# Patient Record
Sex: MALE | Race: WHITE | Employment: OTHER | URBAN - METROPOLITAN AREA
[De-identification: names, ages, dates, MRNs, and addresses within clinical notes are randomized per-mention and may not be internally consistent; named-entity substitution may affect disease eponyms.]

---

## 2017-01-01 ENCOUNTER — APPOINTMENT (OUTPATIENT)
Dept: CT IMAGING | Facility: HOSPITAL | Age: 82
End: 2017-01-01
Attending: EMERGENCY MEDICINE
Payer: MEDICARE

## 2017-01-01 ENCOUNTER — APPOINTMENT (OUTPATIENT)
Dept: GENERAL RADIOLOGY | Facility: HOSPITAL | Age: 82
End: 2017-01-01
Attending: EMERGENCY MEDICINE
Payer: MEDICARE

## 2017-01-01 ENCOUNTER — OFFICE VISIT (OUTPATIENT)
Dept: NEUROLOGY | Facility: CLINIC | Age: 82
End: 2017-01-01

## 2017-01-01 ENCOUNTER — HOSPITAL ENCOUNTER (EMERGENCY)
Facility: HOSPITAL | Age: 82
Discharge: HOME | End: 2017-01-01
Attending: EMERGENCY MEDICINE
Payer: MEDICARE

## 2017-01-01 ENCOUNTER — TELEPHONE (OUTPATIENT)
Dept: NEUROLOGY | Facility: CLINIC | Age: 82
End: 2017-01-01

## 2017-01-01 ENCOUNTER — HOSPITAL ENCOUNTER (OUTPATIENT)
Dept: ULTRASOUND IMAGING | Age: 82
Discharge: HOME | End: 2017-01-01
Attending: INTERNAL MEDICINE
Payer: MEDICARE

## 2017-01-01 ENCOUNTER — PRIOR ORIGINAL RECORDS (OUTPATIENT)
Dept: OTHER | Age: 82
End: 2017-01-01

## 2017-01-01 VITALS
DIASTOLIC BLOOD PRESSURE: 72 MMHG | WEIGHT: 174 LBS | HEART RATE: 60 BPM | SYSTOLIC BLOOD PRESSURE: 122 MMHG | BODY MASS INDEX: 25 KG/M2 | RESPIRATION RATE: 16 BRPM

## 2017-01-01 VITALS
HEIGHT: 70 IN | BODY MASS INDEX: 25.77 KG/M2 | SYSTOLIC BLOOD PRESSURE: 122 MMHG | WEIGHT: 180 LBS | RESPIRATION RATE: 16 BRPM | DIASTOLIC BLOOD PRESSURE: 74 MMHG | HEART RATE: 80 BPM

## 2017-01-01 VITALS
TEMPERATURE: 98 F | HEART RATE: 66 BPM | RESPIRATION RATE: 18 BRPM | OXYGEN SATURATION: 99 % | SYSTOLIC BLOOD PRESSURE: 142 MMHG | DIASTOLIC BLOOD PRESSURE: 90 MMHG

## 2017-01-01 DIAGNOSIS — R56.9 SEIZURES (HCC): ICD-10-CM

## 2017-01-01 DIAGNOSIS — I69.320 APHASIA AS LATE EFFECT OF CEREBROVASCULAR ACCIDENT: ICD-10-CM

## 2017-01-01 DIAGNOSIS — IMO0002 APHASIA COMPLICATING STROKE: Primary | ICD-10-CM

## 2017-01-01 DIAGNOSIS — S51.802A AVULSION OF SKIN OF LEFT FOREARM, INITIAL ENCOUNTER: Primary | ICD-10-CM

## 2017-01-01 DIAGNOSIS — G93.40 ENCEPHALOPATHY: Primary | ICD-10-CM

## 2017-01-01 DIAGNOSIS — M79.89 LEG SWELLING: ICD-10-CM

## 2017-01-01 DIAGNOSIS — G93.40 ENCEPHALOPATHY: ICD-10-CM

## 2017-01-01 PROCEDURE — 82550 ASSAY OF CK (CPK): CPT | Performed by: INTERNAL MEDICINE

## 2017-01-01 PROCEDURE — 99214 OFFICE O/P EST MOD 30 MIN: CPT | Performed by: OTHER

## 2017-01-01 PROCEDURE — 99283 EMERGENCY DEPT VISIT LOW MDM: CPT

## 2017-01-01 PROCEDURE — 80053 COMPREHEN METABOLIC PANEL: CPT | Performed by: INTERNAL MEDICINE

## 2017-01-01 PROCEDURE — 93971 EXTREMITY STUDY: CPT | Performed by: INTERNAL MEDICINE

## 2017-01-01 PROCEDURE — 70450 CT HEAD/BRAIN W/O DYE: CPT | Performed by: EMERGENCY MEDICINE

## 2017-01-01 PROCEDURE — 84443 ASSAY THYROID STIM HORMONE: CPT | Performed by: INTERNAL MEDICINE

## 2017-01-01 PROCEDURE — 82306 VITAMIN D 25 HYDROXY: CPT | Performed by: INTERNAL MEDICINE

## 2017-01-01 PROCEDURE — 73090 X-RAY EXAM OF FOREARM: CPT | Performed by: EMERGENCY MEDICINE

## 2017-01-01 PROCEDURE — 85025 COMPLETE CBC W/AUTO DIFF WBC: CPT | Performed by: INTERNAL MEDICINE

## 2017-01-01 PROCEDURE — 80061 LIPID PANEL: CPT | Performed by: INTERNAL MEDICINE

## 2017-01-01 RX ORDER — QUETIAPINE 50 MG/1
50 TABLET, FILM COATED ORAL NIGHTLY
Qty: 30 TABLET | Refills: 3 | Status: SHIPPED | OUTPATIENT
Start: 2017-01-01 | End: 2018-01-01 | Stop reason: DRUGHIGH

## 2017-01-01 RX ORDER — QUETIAPINE 50 MG/1
50 TABLET, FILM COATED ORAL NIGHTLY
Qty: 30 TABLET | Refills: 3 | Status: SHIPPED | OUTPATIENT
Start: 2017-01-01 | End: 2017-01-01

## 2017-01-01 RX ORDER — TETANUS AND DIPHTHERIA TOXOIDS ADSORBED 2; 2 [LF]/.5ML; [LF]/.5ML
0.5 INJECTION INTRAMUSCULAR ONCE
Status: DISCONTINUED | OUTPATIENT
Start: 2017-01-01 | End: 2017-01-01

## 2017-01-13 ENCOUNTER — TELEPHONE (OUTPATIENT)
Dept: NEUROLOGY | Facility: CLINIC | Age: 82
End: 2017-01-13

## 2017-01-13 NOTE — TELEPHONE ENCOUNTER
Spoke to patient's daughter. She states that patient has a follow-up with Dr. You Handy this Monday. She is calling because she is concerned about patient's sleeping pattern.  She states that he is up to melatonin 7 mg at night which helps sometimes but not all

## 2017-01-13 NOTE — TELEPHONE ENCOUNTER
Talk to the patient's daughter– family is resistant to start any alprazolam.Stacey.   He has not had any significant

## 2017-01-16 ENCOUNTER — OFFICE VISIT (OUTPATIENT)
Dept: NEUROLOGY | Facility: CLINIC | Age: 82
End: 2017-01-16

## 2017-01-16 VITALS
HEIGHT: 71 IN | DIASTOLIC BLOOD PRESSURE: 80 MMHG | SYSTOLIC BLOOD PRESSURE: 132 MMHG | RESPIRATION RATE: 16 BRPM | WEIGHT: 175 LBS | BODY MASS INDEX: 24.5 KG/M2

## 2017-01-16 DIAGNOSIS — G40.909 SEIZURE DISORDER (HCC): Primary | ICD-10-CM

## 2017-01-16 DIAGNOSIS — F01.50 VASCULAR DEMENTIA WITHOUT BEHAVIORAL DISTURBANCE (HCC): ICD-10-CM

## 2017-01-16 PROCEDURE — 99213 OFFICE O/P EST LOW 20 MIN: CPT | Performed by: OTHER

## 2017-01-16 NOTE — PATIENT INSTRUCTIONS
Refill policies:    • Allow 2 business days for refills; controlled substances may take longer. • Contact our office at least 5 days prior to running out of medication or submit request through the “request refill” option in your [employer] account.   • Ref have a procedure or additional testing performed. [facility]) will contact your insurance carrier to obtain pre-certification or prior authorization.     Unfortunately, ALMITA has seen an increase in denial of payment even though the p

## 2017-01-16 NOTE — PROGRESS NOTES
Neurology follow-up  Patient is doing well. He has not had any word finding difficulty episodes, seizure type episodes that he had over the summer. He is doing well on generic Keppra 500 twice a day.   He underwent physical therapy and his balance is impr

## 2017-04-21 NOTE — PROGRESS NOTES
Tammy Santos : 1932     HPI:   Patient presents with:  Neurologic Problem: Patient was seen by Dr. Yulisa Prieto 17. Wife, Salima Coffey, accompanies.  Wife says that from May until 2016, patient has had episodes of slurred speech, droopy left tablet Rfl: 1   EXELON 4.6 MG/24HR Transdermal Patch 24 Hr PLACE 1 PATCH ONTO THE SKIN DAILY.  Disp: 90 patch Rfl: 1   CLOPIDOGREL BISULFATE 75 MG Oral Tab TAKE 1 TABLET BY MOUTH EVERY DAY Disp: 90 tablet Rfl: 1   Melatonin 3 MG Oral Cap Take 2 capsules by benign    • Seizure disorder [facility])    • Cerebrovascular accident (CVA) [facility])    • Cholecystitis           Past Surgical History    HERNIA SURGERY  11/21/2003    KNEE REPLACEMENT SURGERY Left 12/2004    CHOLECYSTECTOMY      SKIN SURGERY  2013    Comment can upper or lower extremities  PSYCHE:no depression or anxiety  NEURO: As in HPI    EXAM:     Vitals:  /72 mmHg  Pulse 60  Resp 16  Wt 174 lb . There was no orthostatic change in blood pressure. General appearance:  In no distress  CV: No  Evidence of

## 2017-09-13 NOTE — ED PROVIDER NOTES
Patient Seen in: [facility] AND [facility] Emergency Department    History   Patient presents with:  Eval-P (psychiatric)    Stated Complaint:     HPI    80-year-old male with history of aphasia status post CVA, dementia presents for evaluation of aggression.   P shoulder surgery (RTC)  (on 4/21/17 wife said                pt did not have shoulder surgery)  05/29/12: OTHER SURGICAL HISTORY      Comment: LARIAT procedure  2013: SKIN SURGERY      Comment: cancerous mole on face removed    Family History   Problem Rel has a normal mood and affect. Nursing note and vitals reviewed. Pulse Ox: 96%, Normal, RA    Radiology findings:     CT Brain CONCLUSION:   1. No acute intracranial process by noncontrast CT technique.   2. Senescent changes of parenchymal volume los ============================================================  ED Course  ------------------------------------------------------------  MDM   Differential diagnosis includes UTI, electrolyte imbalance, hyperammonemia, intracranial hemorrhage, dementia

## 2017-09-13 NOTE — ED INITIAL ASSESSMENT (HPI)
Pt came in via EMS for increased agitation related to dementia. Wife called EMS for psych eval. RR even and nonlabored, speaking in full sentences, appears in NAD. Pt only oriented to first name.

## 2017-09-13 NOTE — PROGRESS NOTES
09/13/17 1554   Clinical Encounter Type   Visited With Patient and family together   Routine Visit Introduction   Continue Visiting Yes   Crisis Visit ED   Patient Spiritual Encounters   Spiritual Assessment Completed 2     Introduced pt and family to s

## 2017-09-15 NOTE — PROGRESS NOTES
Andry Reece : 1932     HPI:   Patient presents with:  Dementia: Patient presents today w/ wife and daughter for dementia. Pt's wife states dementia has worsened. Pt has been more aggressive and agitated in the past few weeks.  Pt was in the ER Fumarate (SEROQUEL) 50 MG Oral Tab Take 1 tablet (50 mg total) by mouth nightly.  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 I did discuss the side effect profile with him, including the reports of sudden death in dementia patients.   I suggested psychiat

## 2017-10-09 NOTE — ED PROVIDER NOTES
Patient Seen in: [facility] AND [facility] Emergency Department    History   Patient presents with:  Trauma (cardiovascular, musculoskeletal)    Stated Complaint: Trauma    HPI    80-year-old male patient presents emergency department after having fallen onto hi shoulder surgery)  05/29/12: OTHER SURGICAL HISTORY      Comment: LARIAT procedure  2013: SKIN SURGERY      Comment: cancerous mole on face removed    Family History   Problem Relation Age of Onset   • Stroke Mother    • Diabetes Mother    • Stroke Brother display    ============================================================  ED Course  ------------------------------------------------------------  MDM     Wound cleaned and redressed.       Disposition and Plan     Clinical Impression:  Avulsion of skin of l
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
## 2017-12-27 NOTE — TELEPHONE ENCOUNTER
S/w Zuly Funez on hipaa she stated pt is having worsening sleeping problems. Zuly Armin stated pt is a different person during the night, is sleeping 3-5 hrs nightly and is up and down the rest of the night. Pt currently taking Seroquel 75 mg nightly.  Jim Doe

## 2017-12-28 NOTE — TELEPHONE ENCOUNTER
Spoke with patient's spouse, Conor Scott, and advised patient should make an appointment to discuss options. Wife would like an appointment with Dr Chitra Aiken asap.

## 2018-01-01 ENCOUNTER — APPOINTMENT (OUTPATIENT)
Dept: GENERAL RADIOLOGY | Facility: HOSPITAL | Age: 83
DRG: 178 | End: 2018-01-01
Attending: INTERNAL MEDICINE
Payer: MEDICARE

## 2018-01-01 ENCOUNTER — APPOINTMENT (OUTPATIENT)
Dept: GENERAL RADIOLOGY | Facility: HOSPITAL | Age: 83
End: 2018-01-01
Attending: EMERGENCY MEDICINE
Payer: MEDICARE

## 2018-01-01 ENCOUNTER — TELEPHONE (OUTPATIENT)
Dept: NEUROLOGY | Facility: CLINIC | Age: 83
End: 2018-01-01

## 2018-01-01 ENCOUNTER — PATIENT MESSAGE (OUTPATIENT)
Dept: NEUROLOGY | Facility: CLINIC | Age: 83
End: 2018-01-01

## 2018-01-01 ENCOUNTER — OFFICE VISIT (OUTPATIENT)
Dept: NEUROLOGY | Facility: CLINIC | Age: 83
End: 2018-01-01

## 2018-01-01 VITALS
HEART RATE: 80 BPM | SYSTOLIC BLOOD PRESSURE: 126 MMHG | WEIGHT: 176 LBS | DIASTOLIC BLOOD PRESSURE: 64 MMHG | HEIGHT: 71 IN | RESPIRATION RATE: 16 BRPM | BODY MASS INDEX: 24.64 KG/M2

## 2018-01-01 DIAGNOSIS — G40.909 SEIZURE DISORDER (HCC): ICD-10-CM

## 2018-01-01 DIAGNOSIS — G47.9 SLEEP DISORDER: ICD-10-CM

## 2018-01-01 DIAGNOSIS — G93.40 ENCEPHALOPATHY: Primary | ICD-10-CM

## 2018-01-01 PROCEDURE — 71045 X-RAY EXAM CHEST 1 VIEW: CPT | Performed by: INTERNAL MEDICINE

## 2018-01-01 PROCEDURE — 71045 X-RAY EXAM CHEST 1 VIEW: CPT | Performed by: EMERGENCY MEDICINE

## 2018-01-01 PROCEDURE — 99213 OFFICE O/P EST LOW 20 MIN: CPT | Performed by: OTHER

## 2018-01-01 RX ORDER — DIVALPROEX SODIUM 500 MG/1
1000 TABLET, EXTENDED RELEASE ORAL DAILY
Qty: 60 TABLET | Refills: 5 | Status: SHIPPED | OUTPATIENT
Start: 2018-01-01 | End: 2018-01-01

## 2018-01-01 RX ORDER — QUETIAPINE 50 MG/1
75 TABLET, FILM COATED ORAL NIGHTLY
COMMUNITY
End: 2018-01-01
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
## 2018-01-04 NOTE — TELEPHONE ENCOUNTER
S/w Kaya Santa on hipaa she stated pt was to discontinue Keppra and start Depakote. Kaya Santa would like to know if pt needs to be weaned off of Keppra before starting Depakote. Please advise.

## 2018-01-05 NOTE — TELEPHONE ENCOUNTER
Wife was concerned with just stopping the Keppra w/o weaning the patient off. I told her the Depakote would cover for Keppra but she wants to be certain she doesn't have to wean the patient off [address]. Please advise.

## 2018-01-05 NOTE — TELEPHONE ENCOUNTER
Wife called. Will start Depakote. Will continue Keppra. If he tolerates the Depakote, will cut Keppra to 500mg daily for 1 week, then stop.

## 2018-01-10 NOTE — TELEPHONE ENCOUNTER
Pts wife has concerns since starting the Depakote, pt has more confusion but is sleeping better, she would like to speak with someone

## 2018-01-15 NOTE — TELEPHONE ENCOUNTER
Regarding: Visit Follow-up Question  Contact: [phone]  ----- Message from Eduar Lopez MA sent at 1/15/2018 11:12 AM CST -----       ----- Message from Giancarlo Melgar to Elise Castillo MD sent at 1/14/2018  3:35 PM -----   On Thursday we spoke a

## 2018-01-15 NOTE — TELEPHONE ENCOUNTER
Spoke to patient's wife. She states that that Dr. Junie Borjas decreased depakote last Thursday to 500 mg in pm only. She states that he has continues to have weakness in the legs and on Saturday paged on call physician.  Dr. Dawson Snow advised to take depakote 500

## 2018-01-15 NOTE — TELEPHONE ENCOUNTER
From: Jaycob Weaver  To: Charu Woodson MD  Sent: 1/14/2018 3:35 PM CST  Subject: Visit Follow-up Question    On Thursday we spoke and you decreased [place] depakote to 500 mg at night. He was still weak overnight but ok during day.  Saturday at 4 a

## 2018-01-15 NOTE — TELEPHONE ENCOUNTER
S/w wife, addressed her concerns, advised resume keppra and titrate off VPA: 250 mg QHS x 3 days, then off. Call back mid-week to give us another update; ED warning given.

## 2018-01-16 NOTE — TELEPHONE ENCOUNTER
Unable to tolerate Depakote. Better today. Wants to go back Keppra. Wants to keep same meds for now.
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
## 2018-01-18 NOTE — CM/SW NOTE
T/C from Dr. Pennie Mendez, patient has not medical criteria for admission, will be discharged with family. , Respite Care resources and , Memory care unit informatin given by previous , as well as resources for a place for Mom.
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
## 2018-01-19 PROBLEM — F01.51: Status: ACTIVE | Noted: 2018-01-01

## 2018-01-19 PROBLEM — F03.90 DEMENTIA WITHOUT BEHAVIORAL DISTURBANCE (HCC): Status: ACTIVE | Noted: 2018-01-01

## 2018-01-19 PROBLEM — R45.1 AGITATION: Status: ACTIVE | Noted: 2018-01-01

## 2018-01-19 PROBLEM — F03.90 DEMENTIA WITHOUT BEHAVIORAL DISTURBANCE, UNSPECIFIED DEMENTIA TYPE (HCC): Status: ACTIVE | Noted: 2018-01-01

## 2018-01-19 PROBLEM — R41.0 DELIRIUM, ACUTE: Status: ACTIVE | Noted: 2018-01-01

## 2018-01-19 NOTE — ED PROVIDER NOTES
Patient Seen in: [facility] AND [facility] Emergency Department    History   Patient presents with:  Altered Mental Status (neurologic)    Stated Complaint: AMS, aggression     HPI    81 yo male with h/o worsening dementia who has recently become drastically mor Types: Cigarettes     Quit date: 1/1/2009  Smokeless tobacco: Never Used                      Alcohol use: No                Review of Systems    Positive for stated complaint: AMS, aggression   Other systems are as noted in HPI.   Constitutional and vital Disposition and Plan     Clinical Impression:  Dementia without behavioral disturbance, unspecified dementia type  (primary encounter diagnosis)  Agitation    Disposition:  There is no disposition on file for this visit.   There is no disposition

## 2018-01-19 NOTE — H&P
[facility] HOSP - [facility]    History and Physical    Gay Kingston Patient Status:  Observation    1932 MRN [id number]   Location [facility] 1W Attending Tasha Samuel MD   Hosp Day # 0 PCP Pilo Zuleta MD     Date:  2018  Date (on 4/21/17 wife said                pt did not have shoulder surgery)  05/29/12: OTHER SURGICAL HISTORY      Comment: LARIAT procedure  2013: SKIN SURGERY      Comment: cancerous mole on face removed  Family History   Problem Relation Age of Onset   • Str KRILL OIL OR Take 1 tablet by mouth daily. B Complex-Biotin-FA (BALANCED B COMPLEX OR) Take 1 tablet by mouth daily. Cyanocobalamin (VITAMIN B 12 OR) Take  by mouth.        Review of Systems:     Unable to perform ROS    Per wife only confusion and agit consult  Hx of cva  Hx dementia  Hx of cad,atrial fib    Recent workup for any acute events was negative in the emergency room. Will admit the patient and observe for now. Await psych input. Patient is a DNR. Discussed with wife and daughter.

## 2018-01-19 NOTE — ED NOTES
Pt's daughter requesting pt's morning meds of plavix, keppra and midodrine. MD made aware and medications ordered.
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
## 2018-01-19 NOTE — ED NOTES
Report given to asif valdez. Pt tolerated transfer well. RN aware unable to get BP d/t pt's status. Pt unable to sit still.

## 2018-01-19 NOTE — ED PROVIDER NOTES
Addendum:  Patient still quite combative. This appears to be a relatively new onset of delirium. Patient not stable for transfer to a memory care unit at this point given his continued aggressiveness and delirium. Discussed with primary care physician.

## 2018-01-20 NOTE — CONSULTS
[facility]    Report of Consultation    Giancarlo Melgar Patient Status:  Observation    1932 MRN [id number]   Location [facility] 5SW/SE Attending Daniel Westbrook MD   Hosp Day # 0 PCP Jaswinder Vazquez MD     Date of Admiss reported that patient have no previous history of depression but he has a history of heavy drinking in the past.  They indicated that he has a 2 stroke and left with only some residual expressive aphasia.   Reviewing the CT scan indicated that patient have • Diabetes Mother    • Stroke Brother        Social History  Smoking status: Former Smoker                                                              Packs/day: 0.00      Years: 0.00         Types: Cigarettes     Quit date: 1/1/2009  Smokeless tobacco OR) Take  by mouth. B Complex-Biotin-FA (BALANCED B COMPLEX OR) Take 1 tablet by mouth daily. Cyanocobalamin (VITAMIN B 12 OR) Take  by mouth. divalproex Sodium  MG Oral Tablet 24 Hr Take 2 tablets (1,000 mg total) by mouth daily.    acetaminoph the current symptom and severity. The patient with multi factor of cognitive impairment such multi-infarct, brain atrophy and microvascular changes.   Adding Depakote was very appropriate approach otherwise the response might not be exactly as a result of

## 2018-01-20 NOTE — PROGRESS NOTES
Pt arrived on unit without any IV access due to being agitated earlier in ER.  was able to establish IV access on right hand, 22g.  IV fluids 0.9 NS started at 125ml/hr. Pt became agitated and ripped IV out about an hour later.   MD Jorgito Ibrahim made

## 2018-01-20 NOTE — PLAN OF CARE
Problem: Patient Centered Care  Goal: Patient preferences are identified and integrated in the patient's plan of care  Interventions:  - What would you like us to know as we care for you?  Very pleasant and non-combative; dementia  - Provide timely, complet INFECTION - ADULT  Goal: Absence of fever/infection during anticipated neutropenic period  INTERVENTIONS  - Monitor WBC  - Administer growth factors as ordered  - Implement neutropenic guidelines   Outcome: Progressing      Problem: SAFETY ADULT - FALL  Go

## 2018-01-20 NOTE — PLAN OF CARE
Problem: Patient Centered Care  Goal: Patient preferences are identified and integrated in the patient's plan of care  Interventions:  - What would you like us to know as we care for you?   - Provide timely, complete, and accurate information to patient/fa period  INTERVENTIONS  - Monitor WBC  - Administer growth factors as ordered  - Implement neutropenic guidelines   Outcome: Progressing  Pt afebrile throughout shift    Problem: SAFETY ADULT - FALL  Goal: Free from fall injury  INTERVENTIONS:  - Assess pt

## 2018-01-21 PROBLEM — J69.0 ASPIRATION PNEUMONIA (HCC): Status: ACTIVE | Noted: 2018-01-01

## 2018-01-21 NOTE — PLAN OF CARE
Problem: Patient Centered Care  Goal: Patient preferences are identified and integrated in the patient's plan of care  Interventions:  - What would you like us to know as we care for you?  Very pleasant and non-combative; dementia  - Provide timely, complet FOR INFECTION - ADULT  Goal: Absence of fever/infection during anticipated neutropenic period  INTERVENTIONS  - Monitor WBC  - Administer growth factors as ordered  - Implement neutropenic guidelines   Outcome: Progressing      Problem: SAFETY ADULT - FALL Started on PO levaquin and pulm consulted.

## 2018-01-21 NOTE — SLP NOTE
Attempt x2 for BSSE. RN reports pt not appropriate for swallow evaluation at this time d/t reduced RAMSEY. SLP to f/u as pt able to safely participate in P.O Trials.       Therapist:  Mandeep Hansen M.S. [id number] [facility]  Speech Language Pathologist

## 2018-01-21 NOTE — SLP NOTE
BSSE SLP orders received and acknowledged. Pt asleep with reduced RAMSEY per family. Family asked SLP to f/u at a later time. SLP to f/u as pt able to safely participate in P.O Trials. Thank you.     Mireya Reagan M.S. CCC-SLP  Speech Language Pathologis

## 2018-01-21 NOTE — PLAN OF CARE
Problem: Patient Centered Care  Goal: Patient preferences are identified and integrated in the patient's plan of care  Interventions:  - What would you like us to know as we care for you?  Very pleasant and non-combative; dementia  - Provide timely, complet INFECTION - ADULT  Goal: Absence of fever/infection during anticipated neutropenic period  INTERVENTIONS  - Monitor WBC  - Administer growth factors as ordered  - Implement neutropenic guidelines   Outcome: Progressing      Problem: SAFETY ADULT - FALL  Go Skin precautions in place. Will continue to monitor.

## 2018-01-21 NOTE — BH PROGRESS NOTE
Chart reviewed, spoke with RN, wife and dtr. Pt seen. RN and family feel that he is better with Zyprexa at night. However, he was agitated last night, and needed Ativan about 4:30 AM.  This has led him to be somewhat sedated this morning.   He only

## 2018-01-21 NOTE — PROGRESS NOTES
[facility]    Progress Note    [facility] Patient Status:  Observation    1932 MRN [id number]   Location [facility] 5SW/SE Attending Agnes Shaikh MD   Hosp Day # 0 PCP Shelli Wilson MD     Subjective:     Constit likely. Short-term posttreatment followup suggested.                        Ludivina Celis MD  1/20/2018

## 2018-01-22 NOTE — CONSULTS
Pulmonary/Critical Care Consult Note    HPI:   Kalli Topete is a 80year old male with Patient presents with:  Altered Mental Status (neurologic)    Chantell Deal MD    Pt is a 81 yo with dementia, afib, HL, CVA, and other med prob admitted with karime Oral Nightly   levofloxacin (LEVAQUIN) tab 750 mg 750 mg Oral Daily   levETIRAcetam (KEPPRA) 100 MG/ML solution 500 mg 500 mg Oral BID   acetaminophen (TYLENOL) 650 MG rectal suppository 650 mg 650 mg Rectal Q6H PRN   bisacodyl (DULCOLAX) rectal suppositor Relation Age of Onset   • Stroke Mother    • Diabetes Mother    • Stroke Brother             PHYSICAL EXAM:   /73 (BP Location: Right arm)   Pulse 51   Temp 98.2 °F (36.8 °C) (Axillary)   Resp 20   Ht 5' 8\" (1.727 m)   Wt 208 lb (94.3 kg)   SpO2 100

## 2018-01-22 NOTE — PLAN OF CARE
Problem: Patient Centered Care  Goal: Patient preferences are identified and integrated in the patient's plan of care  Interventions:  - What would you like us to know as we care for you?  Very pleasant and non-combative; dementia  - Provide timely, complet RISK FOR INFECTION - ADULT  Goal: Absence of fever/infection during anticipated neutropenic period  INTERVENTIONS  - Monitor WBC  - Administer growth factors as ordered  - Implement neutropenic guidelines   Outcome: Progressing      Problem: SAFETY ADULT - Staff attempted to reorient patient, turn lights down so they weren't so bright, and put on calming music. PRN ativan and haldol administered IM.  Another attempt was made to administer HS meds after administration of PRN ativan and haldol, however the pat

## 2018-01-22 NOTE — DIETARY NOTE
ADULT NUTRITION INITIAL ASSESSMENT    Pt is at moderate nutrition risk. Pt does not meet malnutrition criteria. RECOMMENDATIONS TO MD:   See Nutrition Intervention.       NUTRITION DIAGNOSIS/PROBLEM:  Inadequate oral intake related to decreased abilit Encounters:  01/20/18 : 94.3 kg (208 lb)  01/17/18 : 81.6 kg (180 lb)  01/03/18 : 79.8 kg (176 lb)  11/10/17 : 79.8 kg (176 lb)  09/15/17 : 81.6 kg (180 lb)  09/13/17 : 81.6 kg (180 lb)    GASTROINTESTINAL PROBLEMS: await Swallow eval for safe diet.      FO

## 2018-01-22 NOTE — SLP NOTE
Attempted to see pt for swallowing evaluation. Collaborated with RN. RN and speech therapist went into the room to attempt swallowing evaluation. Pt had difficulty remaining awake. At times the pt would open his eyes but then close then again.   A spoon

## 2018-01-22 NOTE — PROGRESS NOTES
Pulmonary/Critical Care Follow Up Note    HPI:   Harley Phlegm is a 80year old male with Patient presents with:  Altered Mental Status (neurologic)      PCP Ja Green MD  Admission Attending Edyta Thomas 15 Day #1    agiation all AM infusion, , Intravenous, Continuous  •  Enoxaparin Sodium (LOVENOX) 40 MG/0.4ML injection 40 mg, 40 mg, Subcutaneous, Nightly  •  escitalopram (LEXAPRO) tablet 5 mg, 5 mg, Oral, Nightly      Allergies:    Morphine                    Comment:Other reaction(

## 2018-01-22 NOTE — PROGRESS NOTES
[facility]    Progress Note    Gareld Round Patient Status:  Observation    1932 MRN [id number]   Location [facility] 5SW/SE Attending Edgard Vidal MD   Hosp Day # 0 PCP Christiane Casas MD     Subjective:     Constit Portable  (cpt=71045)    Result Date: 1/20/2018  CONCLUSION:   New medial right basilar airspace disease, suspicious for pneumonia given the provided clinical history.  Aspiration within the differential. Atelectasis is also possible but considered less lik

## 2018-01-23 NOTE — PROGRESS NOTES
[facility]     Progress Note        Bernette Boeck Patient Status:  Inpatient    1932 MRN [id number]   Location [facility] 5SW/SE Attending Goran Manriquez MD   Hosp Day # 2 PCP Graham Apodaca MD       Subjective:   Jon Chau abdomen soft, non tender  Extremities: no clubbing, cyanosis, edema  Skin: warm, dry      Results:     Lab Results  Component Value Date   WBC 7.3 01/23/2018   HGB 14.6 01/23/2018   HCT 43.5 01/23/2018    01/23/2018   CREATSERUM 0.81 01/23/2018   BU

## 2018-01-23 NOTE — PROGRESS NOTES
[facility]    Progress Note    Aroldo Gold Patient Status:  Observation    1932 MRN [id number]   Location [facility] 5SW/SE Attending Alfredo Garza MD   Hosp Day # 1 PCP Reba Hinds MD     Subjective:     Laure Pratt Xr Chest Ap Portable  (cpt=71045)    Result Date: 1/20/2018  CONCLUSION:   New medial right basilar airspace disease, suspicious for pneumonia given the provided clinical history.  Aspiration within the differential. Atelectasis is also possible but c

## 2018-01-23 NOTE — PLAN OF CARE
Problem: Patient Centered Care  Goal: Patient preferences are identified and integrated in the patient's plan of care  Interventions:  - What would you like us to know as we care for you?  Very pleasant and non-combative; dementia  - Provide timely, complet reports new pain  - Anticipate increased pain with activity and pre-medicate as appropriate   Outcome: Progressing  Appears comfortable  Denies pain      Problem: RISK FOR INFECTION - ADULT  Goal: Absence of fever/infection during anticipated neutropenic p Outcome: Progressing  Placement pending

## 2018-01-23 NOTE — SLP NOTE
Attempted to see pt for bedside swallowing evaluation. Collaborated with RN. RN report pt is sleeping and not appropriate for swallowing evaluation at this time. RN to call speech wif pt becomes alert and appropriate for swallowing evaluation.   Speech t

## 2018-01-24 NOTE — PLAN OF CARE
Problem: Patient Centered Care  Goal: Patient preferences are identified and integrated in the patient's plan of care  Interventions:  - What would you like us to know as we care for you?  Very pleasant and non-combative; dementia  - Provide timely, complet non-pharmacological measures as appropriate and evaluate response  - Consider cultural and social influences on pain and pain management  - Manage/alleviate anxiety  - Utilize distraction and/or relaxation techniques  - Monitor for opioid side effects  - N POLST form as appropriate  - Assess patient's ability to be responsible for managing their own health  - Refer to Case Management Department for coordinating discharge planning if the patient needs post-hospital services based on physician/LIP order or com

## 2018-01-24 NOTE — SLP NOTE
ADULT SWALLOWING EVALUATION    ASSESSMENT    ASSESSMENT/OVERALL IMPRESSION:  Pt seen sitting upright in bed on limited PO trials of thin liquids via straw amount. Per MD orders, pt on general/thin liquid diet.  However, No SLP evaluation completed up until Cerebrovascular accident 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    In Progress     FOLLOW UP  Treatment Plan/Recommendations: Aspiration precautions;SLP to reassess  Number of Visits to Meet Established Goals: 2  Follow Up Needed: Yes  OSCAR Ceballos

## 2018-01-24 NOTE — PROGRESS NOTES
[facility]    Progress Note    Candiss Phlegm Patient Status:  Observation    1932 MRN [id number]   Location [facility] 5SW/SE Attending Beba Jeong MD   Hosp Day # 3 PCP Ja Green MD     Subjective:     Sonya Santa (cpt=71045)    Result Date: 1/23/2018  CONCLUSION: Small left pleural effusion and bibasilar pulmonary opacities. The opacities could represent atelectasis or pneumonia.   Both lung bases are better aerated since prior exam.                     Kristofer Labrador

## 2018-01-24 NOTE — SLP NOTE
Attempted to see pt for bedside swallowing evaluation. Collaborated with RN, Denisse Mathew. RN report pt is sleeping and not appropriate for swallowing evaluation at this time. RN to call speech if pt becomes alert and appropriate for swallowing evaluation.   Cooper

## 2018-01-25 NOTE — SLP NOTE
ADULT SWALLOWING EVALUATION    ASSESSMENT    ASSESSMENT/OVERALL IMPRESSION:  Patient presents with a severely delayed swallow and occasional absent swallow and clinical signs of aspiration with nectar thick liquids.   Recommend puree diet with honey thick l fibrillation, chronic ([facility]    • Cerebrovascular accident (CVA) ([facility]    • Cerebrovascular accident [facility]) 2012   • Cholecystitis    • Diverticulosis 2006   • Hypercholesterolemia    • Keratosis    • Macular degeneration of both eyes 01/2010   • Nutcracker Elevation Coordination: Appears impaired  (Please note: Silent aspiration cannot be evaluated clinically.  Videofluoroscopic Swallow Study is required to rule-out silent aspiration.)    Esophageal Phase of Swallow: No complaints consistent with possible eso

## 2018-01-25 NOTE — PROGRESS NOTES
Patient is a 80year old   male with history of A. fib, CVA, seizure and the diagnosis of dementia who presented to the hospital for worsening in his confusion and escalating in his aggressive behavior indicate the need for admission and st

## 2018-01-25 NOTE — PLAN OF CARE
Problem: Patient Centered Care  Goal: Patient preferences are identified and integrated in the patient's plan of care  Interventions:  - What would you like us to know as we care for you?  Very pleasant and non-combative; dementia  - Provide timely, complet INFECTION - ADULT  Goal: Absence of fever/infection during anticipated neutropenic period  INTERVENTIONS  - Monitor WBC  - Administer growth factors as ordered  - Implement neutropenic guidelines   Outcome: Progressing      Problem: SAFETY ADULT - FALL  Go of pain noted. Pt very drowsy most of the morning/early afternoon. Pt then became more alert and interactive. No PRN Ativan or Haldol administered. Encouraged Pt to eat, but still experiencing poor appetite.  SLP worked w/ patient - discussed recommendation

## 2018-01-25 NOTE — SLP NOTE
Attempted to see Pt for RE-BSE. Unable to complete at this time d/t Pt's lethargy and reduced responsiveness. Will f/u in p.m time permitting. Collaborated with RN regarding Pt's swallowing plan of care.        Chante Rosales M.S. ESA/SLP  Speech-Lang

## 2018-01-25 NOTE — DIETARY NOTE
Nutrition Note Update    Received Nursing consult for poor po. Re-visited pt. Pt smiling. Pt is more alert and saw eating puree diet with assist from PCT. Appetite and po remains low but eating Magic cup.    Discussed with wife benefits of additional

## 2018-01-25 NOTE — PHYSICAL THERAPY NOTE
Attempted to see patient for physical therapy evaluation. Patient with dementia. When asked multiple times to get out of bed, reports I don't want to get out of bed because I don't want to.  Patient with minimally increased agitation, patient stated \"Will

## 2018-01-25 NOTE — PROGRESS NOTES
Pulmonary/Critical Care Follow Up Note    HPI:   Tammy Santos is a 80year old male with Patient presents with:  Altered Mental Status (neurologic)      PCP Hadley Fuchs MD  Admission Attending Edyta Boles 15 Day #3    Mild agitatoin (HALDOL) 5 MG/ML injection 1 mg, 1 mg, Intramuscular, Q6H PRN  •  0.9%  NaCl infusion, , Intravenous, Continuous  •  Enoxaparin Sodium (LOVENOX) 40 MG/0.4ML injection 40 mg, 40 mg, Subcutaneous, Nightly      Allergies:    Morphine                    Commen

## 2018-01-25 NOTE — PROGRESS NOTES
[facility]    Progress Note    Kerwin Phan Patient Status:  Inpatient    1932 MRN [id number]   Location [facility] 5SW/SE Attending Tami Prescott MD   Hosp Day # 4 PCP Andrew Godoy MD     Subjective:     Unable to span two midnight's based on the clinical documentation in H+P. Based on patients current state of illness, I anticipate that, after discharge, patient will require TBD.         Hudson Brewer MD  1/25/2018

## 2018-01-25 NOTE — PROGRESS NOTES
[facility]    Progress Note    Thor Givens Patient Status:  Observation    1932 MRN [id number]   Location [facility] 5SW/SE Attending Ottoniel Mcduffie MD   Hosp Day # 4 PCP Miguel Dent MD     Subjective:     Bettie Manus 01/23/2018   CA 8.5 01/23/2018   ALB 4.3 11/10/2017   ALKPHO 76 11/10/2017   BILT 1.0 11/10/2017   TP 5.9 11/10/2017   AST 25 11/10/2017   ALT 17 11/10/2017   TSH 1.43 11/10/2017   PSA 15.7 (H) 11/10/2017   CK 73 11/10/2017       Xr Chest Ap Portable  (cpt

## 2018-01-25 NOTE — OCCUPATIONAL THERAPY NOTE
Chart reviewed. Per RN, patient walks without assistance at home. He has increased agitation during this hospitalization. Patient presents to therapy without his gown on and sitter present.  He is pleasant initially, however with increased agitation with qu

## 2018-01-25 NOTE — PROGRESS NOTES
Pulmonary/Critical Care Follow Up Note    HPI:   Anne-Marie Sow is a 80year old male with Patient presents with:  Altered Mental Status (neurologic)      PCP Shelli Wilson MD  Admission Attending Edyta Nuno 15 Day #4    Less agitation haloperidol lactate (HALDOL) 5 MG/ML injection 1 mg, 1 mg, Intramuscular, Q6H PRN  •  0.9%  NaCl infusion, , Intravenous, Continuous  •  Enoxaparin Sodium (LOVENOX) 40 MG/0.4ML injection 40 mg, 40 mg, Subcutaneous, Nightly      Allergies:    Morphine

## 2018-01-25 NOTE — PLAN OF CARE
Problem: Patient/Family Goals  Goal: Patient/Family Long Term Goal  Patient's Long Term Goal: Have proper longterm placement post discharge    Interventions:  - Work with SW to assist with D/C planning  - See additional Care Plan goals for specific interve pt to call for assistance with activity based on assessment  - Modify environment to reduce risk of injury  - Provide assistive devices as appropriate  - Consider OT/PT consult to assist with strengthening/mobility  - Encourage toileting schedule   Outcome

## 2018-01-26 NOTE — PROGRESS NOTES
[facility]    Progress Note    Carl Dontaemario Patient Status:  Inpatient    1932 MRN [id number]   Location [facility] 5SW/SE Attending Charis Canela MD   Hosp Day # 5 PCP Aby Betancourt MD     Subjective:     Unable to services that will reasonably be expected to span two midnight's based on the clinical documentation in H+P. Based on patients current state of illness, I anticipate that, after discharge, patient will require TBD.         Tere Mitchell MD  1/25/2018

## 2018-01-26 NOTE — PROGRESS NOTES
Pulmonary/Critical Care Follow Up Note    HPI:   Hortensia Zapata is a 80year old male with Patient presents with:  Altered Mental Status (neurologic)      PCP Ludivina Celis MD  Admission Attending Edyta Crowley 15 Day #5    Less agitation Once  •  Midodrine HCl (PROAMATINE) tab 10 mg, 10 mg, Oral, Once  •  LORazepam (ATIVAN) tab 1 mg, 1 mg, Oral, Once  •  haloperidol lactate (HALDOL) 5 MG/ML injection 1 mg, 1 mg, Intramuscular, Q6H PRN  •  0.9%  NaCl infusion, , Intravenous, Continuous  •

## 2018-01-26 NOTE — PLAN OF CARE
Problem: Patient Centered Care  Goal: Patient preferences are identified and integrated in the patient's plan of care  Interventions:  - What would you like us to know as we care for you?  Very pleasant and non-combative; dementia  - Provide timely, complet INFECTION - ADULT  Goal: Absence of fever/infection during anticipated neutropenic period  INTERVENTIONS  - Monitor WBC  - Administer growth factors as ordered  - Implement neutropenic guidelines   Outcome: Progressing      Problem: SAFETY ADULT - FALL  Go of pain noted. Pt very drowsy most of the morning/early afternoon. Pt more alert and interactive this afternoon - still restless at times, removing gown. 1x episode of spitting food back out at staff. Verbal redirection performed as necessary.  No PRN Ativa

## 2018-01-26 NOTE — PLAN OF CARE
Problem: Patient/Family Goals  Goal: Patient/Family Long Term Goal  Patient's Long Term Goal: Have proper longterm placement post discharge    Interventions:  - Work with SW to assist with D/C planning  - See additional Care Plan goals for specific interve

## 2018-01-26 NOTE — SLP NOTE
SLP attempt for meal assessment. Pt sleeping and not appropriate for P.O trials at this time. SLP to f/u as pt able to fully participate. Thank you.   Ji Umaña M.S. [address] [facility]  Speech Language Pathologist

## 2018-01-26 NOTE — OCCUPATIONAL THERAPY NOTE
OCCUPATIONAL THERAPY EVALUATION - INPATIENT     Room Number: 539/606-F  Evaluation Date: 1/26/2018  Type of Evaluation: Initial       Physician Order: IP Consult to Occupational Therapy  Reason for Therapy: ADL/IADL Dysfunction and Discharge Planning    OC transfer training;Cognitive reorientation;Patient/Family education;Patient/Family training (goals  18)         OCCUPATIONAL THERAPY MEDICAL/SOCIAL HISTORY     Problem List  Principal Problem:    Dementia without behavioral disturbance, unspecifie Spouse;Daughter (they were unable to care for him)                      Drives: No       Stairs in Home: Pt unabel to state  Use of Assistive Device(s): Pt reported using RW    Prior Level of [place]: Pt was unable to state    SUBJECTIVE  Pt was calm, using toilet, bedpan or urinal? : Total  -   Putting on and taking off regular upper body clothing?: A Lot  -   Taking care of personal grooming such as brushing teeth?: A Lot  -   Eating meals?: A Little    AM-PAC Score:  Score: 12  Approx Degree of Impai

## 2018-01-26 NOTE — PHYSICAL THERAPY NOTE
PHYSICAL THERAPY EVALUATION - INPATIENT     Room Number: 531/531-A  Evaluation Date: 1/26/2018  Type of Evaluation: Initial  Physician Order: PT Eval and Treat    Presenting Problem: agitation, dementia; recent medication change  Reason for Therapy:  Gayathri Joe Recommendations: Sub-acute rehabilitation    PLAN  PT Treatment Plan: Bed mobility; Endurance; Patient education;Gait training;Strengthening;Transfer training  Rehab Potential : Fair  Frequency (Obs): 3x/week (3-5x/wk)       PHYSICAL THERAPY MEDICAL/SOCIAL H surgery)  05/29/12: OTHER SURGICAL HISTORY      Comment: LARIAT procedure  2013: SKIN SURGERY      Comment: 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

## 2018-01-26 NOTE — PROGRESS NOTES
Patient is a 80year old   male with history of A. fib, CVA, seizure and the diagnosis of dementia who presented to the hospital for worsening in his confusion and escalating in his aggressive behavior indicate the need for admission and st admission.

## 2018-01-27 NOTE — PROGRESS NOTES
Patient is a 80year old   male with history of A. fib, CVA, seizure and the diagnosis of dementia who presented to the hospital for worsening in his confusion and escalating in his aggressive behavior indicate the need for admission and st Dr. Sariah Hernandez

## 2018-01-27 NOTE — PLAN OF CARE
Problem: Patient Centered Care  Goal: Patient preferences are identified and integrated in the patient's plan of care  Interventions:  - What would you like us to know as we care for you?  Very pleasant and non-combative; dementia  - Provide timely, complet relaxation techniques  - Monitor for opioid side effects  - Notify MD/LIP if interventions unsuccessful or patient reports new pain  - Anticipate increased pain with activity and pre-medicate as appropriate   Outcome: Progressing  No appearance of pain. the patient needs post-hospital services based on physician/LIP order or complex needs related to functional status, cognitive ability or social support system   Outcome: Progressing  When cleared medically, w/out use of ativan or haldol for time period.

## 2018-01-27 NOTE — PHYSICAL THERAPY NOTE
PHYSICAL THERAPY TREATMENT NOTE - INPATIENT    Room Number: 890/064-B       Presenting Problem: agitation , dementia    Problem List  Principal Problem:    Dementia without behavioral disturbance, unspecified dementia type  Active Problems:    Dementia wi -  Dynamic Standing: Poor +    ACTIVITY TOLERANCE      AM-PAC '6-Clicks' INPATIENT SHORT FORM - BASIC MOBILITY  How much difficulty does the patient currently have. ..  -   Turning over in bed (including adjusting bedclothes, sheets and blankets)?: A Little at assistance level: minimum assistance   Goal #3   Current Status 54 ft with RW with a chair follow: Mod A x 2 and Max A for turns with RW.     Goal #4 Patient will perform bed to/from chair transfer with RW with minimal assistance   Goal #4   Current Stat

## 2018-01-27 NOTE — PROGRESS NOTES
[facility]    Progress Note    Gay Kingston Patient Status:  Inpatient    1932 MRN [id number]   Location [facility] 5SW/SE Attending Tasha Samuel MD   Hosp Day # 6 PCP Pilo Zuleta MD     Subjective:     Constitut 11/10/2017   AST 25 11/10/2017   ALT 17 11/10/2017   TSH 1.43 11/10/2017   PSA 15.7 (H) 11/10/2017   CK 73 11/10/2017                         Steve Graf MD  1/27/2018

## 2018-01-27 NOTE — PLAN OF CARE
Sitter at bedside, bed alarm activated. HOB elevated to prevent aspiration. Patient fed 1 tsp at a time, no straw, honey-thick liquids and pureed diet.

## 2018-01-27 NOTE — PLAN OF CARE
Problem: SAFETY ADULT - FALL  Goal: Free from fall injury  INTERVENTIONS:  - Assess pt frequently for physical needs  - Identify cognitive and physical deficits and behaviors that affect risk of falls.   - [place] fall precautions as indicated by assessme

## 2018-01-28 NOTE — PROGRESS NOTES
[facility]    Progress Note    Aroldo Gold Patient Status:  Inpatient    1932 MRN [id number]   Location [facility] 5SW/SE Attending Alfredo Garza MD   Hosp Day # 7 PCP Reba Hinds MD     Subjective:     Constitut BILT 1.0 11/10/2017   TP 5.9 11/10/2017   AST 25 11/10/2017   ALT 17 11/10/2017   TSH 1.43 11/10/2017   PSA 15.7 (H) 11/10/2017   CK 73 11/10/2017                         Floyd Hansen MD  1/28/2018

## 2018-01-28 NOTE — PLAN OF CARE
Problem: Patient Centered Care  Goal: Patient preferences are identified and integrated in the patient's plan of care  Interventions:  - What would you like us to know as we care for you?  Very pleasant and non-combative; dementia  - Provide timely, complet distraction and/or relaxation techniques  - Monitor for opioid side effects  - Notify MD/LIP if interventions unsuccessful or patient reports new pain  - Anticipate increased pain with activity and pre-medicate as appropriate   Outcome: Progressing  No c./ physician/LIP order or complex needs related to functional status, cognitive ability or social support system   Outcome: Progressing  SNF when cleared medically w/sitter dc'd and no ativan/haldol adm.

## 2018-01-28 NOTE — PROGRESS NOTES
Dr. Candace Wiggins asked per telephone call if patient should continue to have sitter. Dr. Candace Wiggins stated he was concerned about patient falling d/t confusion and that patient at risk for fracture.     Dr. Candace Wiggins stated to continue sitter through the night and that pa

## 2018-01-28 NOTE — PLAN OF CARE
Problem: SAFETY ADULT - FALL  Goal: Free from fall injury  INTERVENTIONS:  - Assess pt frequently for physical needs  - Identify cognitive and physical deficits and behaviors that affect risk of falls.   - [place] fall precautions as indicated by assessme

## 2018-01-29 NOTE — OCCUPATIONAL THERAPY NOTE
OCCUPATIONAL THERAPY TREATMENT NOTE - INPATIENT     Room Number: 531/531-A               Problem List  Principal Problem:    Dementia without behavioral disturbance, unspecified dementia type  Active Problems:    Dementia without behavioral disturbance TBD      PLAN  OT Treatment Plan: Balance activities; Energy conservation/work simplification techniques;ADL training;Functional transfer training;UE strengthening/ROM; Endurance training;Patient/Family education;Cognitive reorientation;Patient/Family traini facial hygiene, safety awareness, RW use, posture   Patient End of Session: With [facility] staff;Up in chair;Needs met;Call light within reach;RN aware of session/findings; All patient questions and concerns addressed    OT Goals:    Patients self stated goal is:

## 2018-01-29 NOTE — DIETARY NOTE
ADULT NUTRITION INITIAL ASSESSMENT    Pt is at moderate nutrition risk. Pt does not meet malnutrition criteria. RECOMMENDATIONS TO MD:   See Nutrition Intervention.       NUTRITION DIAGNOSIS/PROBLEM:  Inadequate oral intake related to decreased abilit MEDICAL HISTORY: Dementia CVA/stroke, TIA, HLP, Aphasia, Diverticulosis, others as documented. ANTHROPOMETRICS:  HT: 172.7 cm (5' 8\")       WT: 94.3 kg (208 lb)--Questionable; per wife 176# at MD office ( c/w wt history) . Wt not obtained.  1/29 Request RD to follow up within 7 days.    Latoya Brooklyn, 66 [address], [address] [facility], [address] [place] [address]   Clinical Dietitian  [phone]

## 2018-01-29 NOTE — PHYSICAL THERAPY NOTE
PHYSICAL THERAPY TREATMENT NOTE - INPATIENT    Room Number: 610/922-B       Presenting Problem: agitation , dementia    Problem List  Principal Problem:    Dementia without behavioral disturbance, unspecified dementia type  Active Problems:    Dementia wi Static Sitting: Fair -  Dynamic Sitting: Poor +           Static Standing: Poor  Dynamic Standing: Poor    ACTIVITY TOLERANCE  Room air    AM-PAC '6-Clicks' INPATIENT SHORT FORM - BASIC MOBILITY  How much difficulty does the ailyn Status 76' ft with RW with a chair follow:  Mod A    Goal #4 Patient will perform bed to/from chair transfer with RW with minimal assistance   Goal #4   Current Status Mod A with the RW   Goal #5 Patient to demonstrate independence with home activity/exerci

## 2018-01-29 NOTE — PLAN OF CARE
Problem: Patient Centered Care  Goal: Patient preferences are identified and integrated in the patient's plan of care  Interventions:  - What would you like us to know as we care for you?  Very pleasant and non-combative; dementia  - Provide timely, complet pre-medicate as appropriate   Outcome: Progressing  No c/o pain this shift.     Problem: RISK FOR INFECTION - ADULT  Goal: Absence of fever/infection during anticipated neutropenic period  INTERVENTIONS  - Monitor WBC  - Administer growth factors as ordered

## 2018-01-29 NOTE — PLAN OF CARE
Problem: Patient Centered Care  Goal: Patient preferences are identified and integrated in the patient's plan of care  Interventions:  - What would you like us to know as we care for you?  Very pleasant and non-combative; dementia  - Provide timely, complet Utilize distraction and/or relaxation techniques  - Monitor for opioid side effects  - Notify MD/LIP if interventions unsuccessful or patient reports new pain  - Anticipate increased pain with activity and pre-medicate as appropriate   Outcome: Progressing patient's ability to be responsible for managing their own health  - Refer to Case Management Department for coordinating discharge planning if the patient needs post-hospital services based on physician/LIP order or complex needs related to functional sta

## 2018-01-30 NOTE — SLP NOTE
SPEECH DAILY NOTE - INPATIENT    ASSESSMENT & PLAN   ASSESSMENT  Patient seen to monitor tolerance of PO diet and assess candidacy for diet upgrade. Patient was seen sitting upright in chair. Patient's daughter participated in session.   Trials of current independently over 3 session(s). Discussed precautions with daughter who then reviewed them again with patient.   In Progress   Goal #3 The patient will utilize compensatory strategies as outlined by  VFSS including Slow rate, Alternate liquids/solids, N

## 2018-01-30 NOTE — PROGRESS NOTES
The patient seen for 35 minute, follow-up evaluation, over 50% counseling and managing treatment plan. Communicating with attending, RN,  and sitter.     Patient is a 80year old   male with history of A. fib, CVA, seizure and education and support. 2.  Continue Seroquel 50 mg mg nightly and 7 PM  3. Change Seroquel XR 50 mg nightly at 7 PM.  4.  Continue Remeron 15 mg nightly. 5.  Continue Lamictal 25 mg twice daily. 6.  Lower Keppra to 250 mg IV 2 times daily  7.   Communic

## 2018-01-30 NOTE — PROGRESS NOTES
[facility]    Progress Note    Gay Kingston Patient Status:  Inpatient    1932 MRN [id number]   Location [facility] 5SW/SE Attending Tasha Samuel MD   Hosp Day # 9 PCP Pilo Zuleta MD     Subjective:     Constitut 01/23/2018   BUN 25 (H) 01/23/2018    (H) 01/23/2018   K 4.0 01/23/2018    01/23/2018   CO2 27 01/23/2018    (H) 01/23/2018   CA 8.5 01/23/2018   ALB 4.3 11/10/2017   ALKPHO 76 11/10/2017   BILT 1.0 11/10/2017   TP 5.9 11/10/2017   AST 2

## 2018-01-30 NOTE — PLAN OF CARE
Problem: Patient Centered Care  Goal: Patient preferences are identified and integrated in the patient's plan of care  Interventions:  - What would you like us to know as we care for you?  Very pleasant and non-combative; dementia  - Provide timely, complet Anticipate increased pain with activity and pre-medicate as appropriate   Outcome: Progressing  No c/o pain at this time;  Will cont to monitor     Problem: RISK FOR INFECTION - ADULT  Goal: Absence of fever/infection during anticipated neutropenic period Outcome: Progressing  Still working with psych/SW to get placement for pt    Problem: Integumentary status not within defined limits  Goal: Pt's integumentary status will be adequate for discharge  Outcome: Progressing  Pt skin intact

## 2018-01-31 NOTE — HOME CARE LIAISON
MET WITH PATIENT'S WIFE TO [address] WIFE IN AGREEMENT WITH SERVICES BEING PROVIDED BY [facility]. PROVIDED [facility] WITH CONTACT INFORMATION, ALONG WITH LIAISON'S BUSINESS CARD.

## 2018-01-31 NOTE — PROGRESS NOTES
The patient seen for 35 minute, follow-up evaluation, over 50% counseling and managing treatment plan. Communicating with attending, RN,  and sitter.     Patient is a 80year old   male with history of A. fib, CVA, seizure and daily.  6.  Continue Keppra to 250 mg IV 2 times daily  7. Communicate with Dr. Nancie Maurice and agree to prepare patient for nursing home.

## 2018-01-31 NOTE — BH PROGRESS NOTE
Behavioral Health SOAP Note  SUBJECTIVE   Patient is an 80year old male with a dx dementia who presented with worsening confusion and aggressive behavior. Today the patient showed continued improvement in mood.   He was calm, pleasant, cooperative and smi

## 2018-01-31 NOTE — OCCUPATIONAL THERAPY NOTE
OCCUPATIONAL THERAPY TREATMENT NOTE - INPATIENT     Room Number: 531/531-A               Problem List  Principal Problem:    Dementia without behavioral disturbance, unspecified dementia type  Active Problems:    Dementia without behavioral disturbance currently need…  -   Putting on and taking off regular lower body clothing?: A Lot  -   Bathing (including washing, rinsing, drying)?: A Lot  -   Toileting, which includes using toilet, bedpan or urinal? : A Lot  -   Putting on and taking off regular upper 2-5-18  Frequency: 3-5x/week

## 2018-01-31 NOTE — PHYSICAL THERAPY NOTE
PHYSICAL THERAPY TREATMENT NOTE - INPATIENT    Room Number: 136/655-V       Presenting Problem: agitation , dementia    Problem List  Principal Problem:    Dementia without behavioral disturbance, unspecified dementia type  Active Problems:    Dementia wi mobility;Gait training;Range of motion;Transfer training;Balance training    SUBJECTIVE  Pt is oriented to self only.      OBJECTIVE  Precautions: Bed/chair alarm;Hard of hearing (Sitter)    WEIGHT BEARING RESTRICTION  Weight Bearing Restriction: None independent mobility   Goal #1 Patient is able to demonstrate supine - sit EOB @ level: supervision     Goal #1   Current Status Max A x 1-2   Goal #2 Patient is able to demonstrate transfers Sit to/from Stand at assistance level: minimum assistance with w

## 2018-01-31 NOTE — PROGRESS NOTES
[facility]    Progress Note    Aroldo Gold Patient Status:  Inpatient    1932 MRN [id number]   Location [facility] 5SW/SE Attending Alfredo Garza MD   Hosp Day # 10 PCP Reba Hinds MD     Subjective:     Unable t

## 2018-01-31 NOTE — PLAN OF CARE
Problem: Patient Centered Care  Goal: Patient preferences are identified and integrated in the patient's plan of care  Interventions:  - What would you like us to know as we care for you?  Very pleasant and non-combative; dementia  - Provide timely, complet pre-medicate as appropriate   Outcome: Progressing  Denies pain.      Problem: RISK FOR INFECTION - ADULT  Goal: Absence of fever/infection during anticipated neutropenic period  INTERVENTIONS  - Monitor WBC  - Administer growth factors as ordered  - Implem limits  Goal: Pt's integumentary status will be adequate for discharge  Outcome: Progressing

## 2018-01-31 NOTE — PROGRESS NOTES
[facility] - [facility]    Progress Note    Mary Mendoza Patient Status:  Inpatient    1932 MRN [id number]   Location [facility] 5SW/SE Attending Ema [facility] MD   Hosp Day # 10 PCP Joyce Cali MD     Subjective:     Constitu 01/23/2018   CO2 27 01/23/2018    (H) 01/23/2018   CA 8.5 01/23/2018   ALB 4.3 11/10/2017   ALKPHO 76 11/10/2017   BILT 1.0 11/10/2017   TP 5.9 11/10/2017   AST 25 11/10/2017   ALT 17 11/10/2017   TSH 1.43 11/10/2017   PSA 15.7 (H) 11/10/2017   CK 7

## 2018-01-31 NOTE — PLAN OF CARE
Problem: Patient Centered Care  Goal: Patient preferences are identified and integrated in the patient's plan of care  Interventions:  - What would you like us to know as we care for you?  Very pleasant and non-combative; dementia  - Provide timely, complet and/or relaxation techniques  - Monitor for opioid side effects  - Notify MD/LIP if interventions unsuccessful or patient reports new pain  - Anticipate increased pain with activity and pre-medicate as appropriate   Outcome: Progressing  Pt has no c/o pain Management Department for coordinating discharge planning if the patient needs post-hospital services based on physician/LIP order or complex needs related to functional status, cognitive ability or social support system   Outcome: Progressing  Pt will be

## 2018-02-01 NOTE — PROGRESS NOTES
The patient seen for 25 minute, follow-up evaluation, over 50% counseling and managing treatment plan. Communicating with RN and .     Patient is a 80year old   male with history of A. fib, CVA, seizure and the diagnosis of de

## 2018-02-01 NOTE — SLP NOTE
SPEECH DAILY NOTE - INPATIENT    ASSESSMENT & PLAN   ASSESSMENT      Pt seen upright in chair with current diet of mechanical soft/nectar-thick liquids for monitoring of diet tolerance. Swallowing precautions/strategies discussed and demonstrated with Pt. patient will utilize compensatory strategies as outlined by  BSE  including Slow rate, Alternate liquids/solids, No straws, multiple swallows Upright 90 degrees, , Feed patient, slightly larger amounts may help to elicit quicker swallow with 1:1 assistance

## 2018-02-01 NOTE — PROGRESS NOTES
Pt discharged, [facility] ambulance provided transportation to [facility]. Discharge information and prescriptions reviewed with daughter. All questions and concerns addressed. IV access discontinued. Monique Gaitan provided to [employer], daughter, before discharge.

## 2018-03-30 NOTE — DISCHARGE SUMMARY
[facility]  Discharge Summary    Bernette Boeck Patient Status:  Inpatient    1932 MRN [id number]   Location [facility] 5SW/SE Attending No att. providers found   Hosp Day # 11 PCP Graham Apodaca MD     Date of Admission: 2018 Facility    Discharge Condition: Fair    Discharge Medications: Discharge Medication List as of 2/1/2018 10:40 AM    START taking these medications    LORazepam 1 MG Oral Tab  Take 1 tablet (1 mg total) by mouth once. , Print, Disp-1 tablet, R-0    lamoTRIg Tab  Take 500 mg by mouth every 6 (six) hours as needed for Pain., Historical    Probiotic Product (ALIGN OR)  Take  by mouth., Historical    OMEGA-3 KRILL OIL OR  Take 1 tablet by mouth daily. , Historical      STOP taking these medications    FUROSEMIDE 2

## 2019-02-28 VITALS
WEIGHT: 71 LBS | DIASTOLIC BLOOD PRESSURE: 60 MMHG | HEART RATE: 69 BPM | SYSTOLIC BLOOD PRESSURE: 124 MMHG | OXYGEN SATURATION: 97 %

## 2022-06-14 NOTE — ED INITIAL ASSESSMENT (HPI)
Patient presents via EMS with wife. Per wife, patient has been becoming more aggressive with altered mental status at home. Patient was seen here Wednesday for the same behaviors. Patient has history of dementia. 1 pair

## 2022-12-20 NOTE — CM/SW NOTE
Addend 1022q: SW spoke w/ Nettie from [facility] and arranged medicar transport for 1pm to [facility]. Pt's daughter Jason Portillo aware of discharge.  ISAC spoke w/ Deloris/Arnulfo from [facility] who stated they will be ready for pt at 1pm.   ------------------------------------
Addend 829n: Mart stated she is reviewing referral and will do on-site eval on Monday.   ----------------------------------------------------------------------------  Gracie from [facility] stated clincals pending acceptance, after onsite evaluation.      ISAC hazel
All scripts referrals sent over per 4W ISR.
At daughter's request, [facility] called to check on available beds. Nurse that answered phone was unable to answer question. Return phone calls to [facility] were not answered x 3.
[facility] called - spoke with Madie GERARD. She is unsure if there are respite beds available, but states they do have memory care beds open in the skilled memory care unit.
INPT FROM 1/21
ISAC sent clinical updates to [facility]. Pt still on sitter at this time. Pt must be off sitter for 24hrs prior to pt going to SNF - staff aware. 11:30AM: ISAC was notified that [facility] currently does not have any male beds available in their dementia unit.  Nani Jim
Met with Pt's family per request regarding memory care placement. Daughter states Pt has been becoming increasingly agitated and has difficulty ambulating. Pt has had recent medication changes due change in status.  Per daughter Ritu Dee gets used to medication
Met with patients wife at bedside to explain the BPCI/Medicare program. Patients  Wife  agreed with phone follow up for 3 months from [facility] after discharge from [address] [place] [address]. Patient was enrolled under DR[facility]   178    .  BPCI/Medicare letter and brochure prov
Patient will be admitted for evaluation. He will need placement pending medical evaluation. Family has list of facilities for placement. Social service for follow up.
Pt has just been transferred up to the medical floor. Previous notes indicate plan for snf placement whenever medically stable with a preference for memory unit. Family request was for [facility].  SW initiated referral via allscripts and requested DON sc
SW spoke w/ pt's wife, Radha Hardin who stated that she and her daughter are making arrangements for pt to go to [facility] w/ 24 hour caregivers.      SW met w/ SW liaison from [facility], who stated they are able to accept pt to their facility w/ 24 hour c
Spoke with Arnoldo from [facility] who states the only room Pt would qualify for with them is Assisted Living. Pt unable to function independently enough at this time.
Spoke with Patria Watson, Liaison from [facility]. She is unsure at this time if they have a memory care bed open. She will call back after 0830 with information on memory care beds and respite care beds.
Spoke with Pt's wife and daughter. They have decided they would like Pt to go to [facility] if possible.
[facility] called per wife's request. Message left for [facility]. Spoke with one of the nurses who was unable to state whether or not they have respite of memory care beds open.
NEGATIVE

## (undated) NOTE — LETTER
[address], [address] [facility]  [address] [facility], [address]  [place]   [phone]        Dear Cristiana Ireland MD,      I had the pleasure of seeing your patient, Zuleima Moore on 1/3/2018.      Below pl divalproex Sodium  MG Oral Tablet 24 Hr Take 2 tablets (1,000 mg total) by mouth daily. Disp: 60 tablet Rfl: 5   EXELON 4.6 MG/24HR Transdermal Patch 24 Hr PLACE 1 PATCH ONTO THE SKIN DAILY.  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 We discussed treatment options, and I suggested stopping the Keppra, in case this is contributing to his aggressive behavior.   Substituting Depakote 1000 mg at bedtime was recommended, since this may provide sylvester

## (undated) NOTE — MR AVS SNAPSHOT
[facility]  [address], [address] [facility]  Malinda Tian [phone]               Thank you for choosing us for your health care visit with Denver Moulds, MD, MD.  We are glad to serve you and happy to provide you with this summary o EXELON 4.6 MG/24HR Pt24   Generic drug:  rivastigmine   PLACE 1 PATCH ONTO THE SKIN DAILY. levETIRAcetam 500 MG Tabs   Take 1 tablet (500 mg total) by mouth 2 (two) times daily.    Commonly known as:  KEPPRA           LORazepam 0.5 MG Tabs   One Don’t eat while distracted and slow down. Avoid over sized portions. Don’t eat while when you’re bored.      EAT THESE FOODS MORE OFTEN: EAT THESE FOODS LESS OFTEN:   Make half your plate fruits and vegetables Highly refined, white starches including wh

## (undated) NOTE — ED AVS SNAPSHOT
Cj Bedoya   MRN: [id number]    Department:  [facility] Emergency Department   Date of Visit:  9/13/2017           Disclosure     Insurance plans vary and the physician(s) referred by the ER may not be covered by your plan.  Please contac CARE PHYSICIAN AT ONCE OR RETURN IMMEDIATELY TO THE EMERGENCY DEPARTMENT. If you have been prescribed any medication(s), please fill your prescription right away and begin taking the medication(s) as directed.   If you believe that any of the medications

## (undated) NOTE — IP AVS SNAPSHOT
Patient Demographics     Address  [address] [place] [address] Phone  [phone] [facility]) *Preferred* E-mail Address  [email or web address]      Emergency Contact(s)     Name Relation Home Work [address] [place] [address] [phone]- Take 2 tablets (50 mg total) by mouth 2 (two) times daily. Reggie Chow MD         levETIRAcetam 250 MG Tabs  Commonly known as:  KEPPRA  Next dose due:  2/1/18      Take 1 tablet (250 mg total) by mouth 2 (two) times daily.    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 Diphenhydramine-APAP, sleep, (TYLENOL PM EXTRA STRENGTH)  MG Oral Tab Take by mouth.    CLOPIDOGREL BISULFATE 75 MG Oral Tab TAKE 1 TABLET BY MOUTH EVERY DAY   SIMVASTATIN 10 MG Oral Tab TAKE 1 TABLET BY MOUTH EVERY DAY   MIDODRINE HCL 10 MG to any commands[KY. 2]         Results:     Lab Results  Component Value Date   WBC 5.8 01/17/2018   HGB 15.9 01/17/2018   HCT 48.4 01/17/2018    (L) 01/17/2018   CREATSERUM 1.19 01/17/2018   BUN 25 (H) 01/17/2018    01/17/2018   K 4.6 01/17/20 Pt is a 81 yo with dementia, afib, HL, CVA, and other med prob admitted with severe dementia related behavioral complications. Pt has been danger to self and others. I am asked to see pt for fever and abnl CXR.  Pt can give no hx          PMH:  Past Medical acetaminophen (TYLENOL) 650 MG rectal suppository 650 mg 650 mg Rectal Q6H PRN   bisacodyl (DULCOLAX) rectal suppository 10 mg 10 mg Rectal Daily PRN   [COMPLETED] LORazepam (ATIVAN) injection 1 mg 1 mg Intramuscular Once   Clopidogrel Bisulfate (PLAVIX) t /73 (BP Location: Right arm)   Pulse 51   Temp 98.2 °F (36.8 °C) (Axillary)   Resp 20   Ht 5' 8\" (1.727 m)   Wt 208 lb (94.3 kg)   SpO2 100%   BMI 31.63 kg/m²     Intake/Output Summary (Last 24 hours) at 01/21/18 2055  Last data filed at 01/21/18 06 Dementia without behavioral disturbance, unspecified dementia type  Active Problems:    Dementia without behavioral disturbance    Agitation    Delirium, acute    Dementia, multiinfarct, with behavioral disturbance    Aspiration pneumonia ([facility]    Episod OBJECTIVE[DB.1]  Precautions: Bed/chair alarm;Hard of hearing (Sitter)[DB. 2]    WEIGHT BEARING RESTRICTION[DB. 1]  Weight Bearing Restriction: None[DB. 2]                PAIN ASSESSMENT[DB. 1]   Ratin     Management Techniques: Activity promotion; Body mec Goal #1 Patient is able to demonstrate supine - sit EOB @ level: supervision     Goal #1   Current Status Max A x 1-2   Goal #2 Patient is able to demonstrate transfers Sit to/from Stand at assistance level: minimum assistance with walker - [address] sitter is present in the room. Pt is max A with bed mobility and to sit EOB. Pt sat EOB for about 5 minutes with mod>CGA with his sitting balance.  Pt initially with posterior lean and cued to correct and held the bed rail to assist. Pt is able to follow [facility] wheelchair, bedside commode, etc.): A Lot   -   Moving from lying on back to sitting on the side of the bed?: A Lot[RM.2]   How much help from another person does the patient currently need. .. [RM.1]   -   Moving to and from a bed to a chair (including a wh Current Status[RM. 1]         Attribution Key    RM. 1 - Yaa Bullard, PTA on 1/29/2018 11:37 AM  RM. 2 - Yaa Bullard, PTA on 1/29/2018 11:38 AM                        Occupational Therapy Notes (last 72 hours) (Notes from 1/29/2018 10:40 AM throug therapy in a supervised setting;Sub-acute rehabilitation  OT Device Recommendations: TBD      PLAN  OT Treatment Plan: Balance activities; Functional transfer training; Endurance training      SUBJECTIVE   pt seen sitting in chair and agreeable for OT melita Education Provided: role of OT , LE dressing with AE PRN, cues for sequencing of all tasks, orienting pt. Patient End of Session: Up in chair; With [facility] staff;Needs met;Call light within reach    OT Goals:  Patient will complete functional transfer with min on bedrail for CGA, however if he let go, noted to lean posteriorly and required assist and verbal instructions for sitting balance EOB. Pt required mod. A for sit to stand from high bed level and mod.  Ax1; 2nd person for safety for bed to chair transfer w -   Putting on and taking off regular lower body clothing?: A Lot  -   Bathing (including washing, rinsing, drying)?: A Lot  -   Toileting, which includes using toilet, bedpan or urinal? : A Lot  -   Putting on and taking off regular upper body clothing?: MS.1 - Latonia Hendrickson, OT on 1/29/2018  1:57 PM  MS. 2 - Latonia Hendrickson, OT on 1/29/2018  2:06 PM                     Video Swallow Study Notes     No notes of this type exist for this encounter.          SLP Note - SLP Notes      SLP Note signed by Cassi Altman Treatment Plan/Recommendations: Dysphagia therapy    Interdisciplinary Communication: Discussed with RN  Disussed with other staff  PCT            GOALS  Goal #1 The patient will tolerate puree diet consistency and honey thick liquids without overt signs o Future Appointments        Provider Alex Elkins    3/26/2018 10:00 AM Gavin Bhatti MD [address] [place] [address], [address] [place] [address], [address], [facility]    3/30/2018 11:40 AM Tami Prescott MD Leonid Chris [address]

## (undated) NOTE — MR AVS SNAPSHOT
[facility]  [address], [address] [facility]  [place] [phone]               Thank you for choosing us for your health care visit with Melanie Eid MD.  We are glad to serve you and happy to provide you with this summary of your of individual in advance and they must present an ID as well. ? The name of the person picking up your prescription must be documented in your chart.   Scheduling Tests    If your physician has ordered radiology tests such as MRI or CT scans, do not schedu Allergies as of Jan 16, 2017     Morphine     Other reaction(s): MORPHINE                Today's Vital Signs     BP Height Weight BMI       132/80 mmHg 71\" 175 lb 24.42 kg/m2          Current Medications          This list is accurate as of: 1/16/17 10:03 Visit [employer]  You can access your MyChart to more actively manage your health care and view more details from this visit by going to [email or web address].   If you've recently had a stay at the Hospital you can access your discharge instructions i

## (undated) NOTE — LETTER
[id number] [facility]  [address], [address] [facility]  [address] [facility] [phone]        Dear Pilo Zuleta MD,      I had the pleasure of seeing your patient, Gay Kingston on 9/15/2017.      Below please find a hong then wants to dress and have breakfast.  This has been difficult for his wife. He typically will pace early in the morning. At times he seems to be having some visual hallucinations. He also has developed problems with urinary incontinence.     He has be B Complex-Biotin-FA (BALANCED B COMPLEX OR) Take 1 tablet by mouth daily. Disp:  Rfl:    Cyanocobalamin (VITAMIN B 12 OR) Take  by mouth. Disp:  Rfl:      No current facility-administered medications for this visit.     Past Medical History:   Diagnosis Terry Packs/day: 0.00      Years: 0.00           Types: Cigarettes       Quit date: 1/1/2009    Smokeless tobacco: Never Used                        Alcohol use:  No              Drug use: No            Other Topics            Concern  Caffeine Concern Coordination: Finger to nose, heel to shin intact bilaterally. Gait: Slightly stooped posture. He has some difficulty with postural reflexes.     ASSESSMENT AND PLAN:     Carmen Middleton 80year old male presents to clinic with history of a dementia,

## (undated) NOTE — ED AVS SNAPSHOT
Charu Campbell   MRN: [id number]    Department:  [facility] Emergency Department   Date of Visit:  10/9/2017           Disclosure     Insurance plans vary and the physician(s) referred by the ER may not be covered by your plan.  Please contac CARE PHYSICIAN AT ONCE OR RETURN IMMEDIATELY TO THE EMERGENCY DEPARTMENT. If you have been prescribed any medication(s), please fill your prescription right away and begin taking the medication(s) as directed.   If you believe that any of the medications

## (undated) NOTE — IP AVS SNAPSHOT
[facility]            (For Outpatient Use Only) Initial Admit Date: 1/19/2018   Inpt/Obs Admit Date: Inpt: 1/21/18 / Obs: 01/19/18   Discharge Date:    Neetu Bush:  [de-identified]   MRN: [de-identified]   CSN: [id number]        ENCOUNTER  Patient Subscriber Name:  Gurmeet Veras :    Subscriber ID:  Pt Rel to Subscriber:    Hospital Account Financial Class: Medicare    2018

## (undated) NOTE — ED AVS SNAPSHOT
Marjorie Pete   MRN: [id number]    Department:  [facility] Emergency Department   Date of Visit:  1/17/2018           Disclosure     Insurance plans vary and the physician(s) referred by the ER may not be covered by your plan.  Please contac within the next three months to obtain basic health screening including reassessment of your blood pressure.     IF THERE IS ANY CHANGE OR WORSENING OF YOUR CONDITION, CALL YOUR PRIMARY CARE PHYSICIAN AT ONCE OR RETURN IMMEDIATELY TO THE EMERGENCY DEPARTMEN

## (undated) NOTE — Clinical Note
[address] [facility], [address] [place] [address], [address]  Yash Hooker [phone]        Dear Iva Norman MD,      I had the pleasure of seeing your patient, Ledy Francisco on 4/21/2017.      Below please find a hong during sleep. He is able to fall asleep without difficulty. He was tried on melatonin, which does help. She has a prescription for Ativan, but she was reluctant to try it.       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 His wife feels his condition has been stable since last visit. He currently is on aspirin, Plavix, and statins for stroke prevention.   He takes B12 supplements, and the last TSH and B12 levels were normal.  He has had no seizures, so the